# Patient Record
Sex: MALE | URBAN - METROPOLITAN AREA
[De-identification: names, ages, dates, MRNs, and addresses within clinical notes are randomized per-mention and may not be internally consistent; named-entity substitution may affect disease eponyms.]

---

## 2021-01-19 ENCOUNTER — OFFICE VISIT (OUTPATIENT)
Dept: URBAN - METROPOLITAN AREA CLINIC 75 | Facility: CLINIC | Age: 81
End: 2021-01-19
Payer: COMMERCIAL

## 2021-01-19 DIAGNOSIS — H35.033 HYPERTENSIVE RETINOPATHY, BILATERAL: ICD-10-CM

## 2021-01-19 DIAGNOSIS — H35.362 DRUSEN (DEGENERATIVE) OF MACULA, LEFT EYE: ICD-10-CM

## 2021-01-19 PROCEDURE — 99204 OFFICE O/P NEW MOD 45 MIN: CPT | Performed by: OPTOMETRIST

## 2021-01-19 ASSESSMENT — INTRAOCULAR PRESSURE
OS: 13
OD: 16

## 2021-01-19 ASSESSMENT — VISUAL ACUITY
OS: 20/30
OD: 20/25

## 2021-01-19 NOTE — IMPRESSION/PLAN
Impression: Combined forms of age-related cataract, bilateral: H25.813. Plan: Cataracts account for the patient's complaints. The patient will monitor vision changes and contact us with any decrease in vision.

## 2021-01-19 NOTE — IMPRESSION/PLAN
Impression: Drusen (degenerative) of macula, left eye: H35.362. Plan: A diet rich in green vegetables is recommended. It is important to follow the amsler grid and report any changes such as increase in distortion or blurring of lines. Drusen may be an early sign of macular degeneration and this diagnosis may be possible in future. Contact office if you experience decrease in vision, blurred vision, distortion in vision or concerning changes in vision. TAKE EYE VITAMINS AND CHECK AMSLER GRID DAILY.  

* Follow up in 6 months DFE w/OCT

## 2021-01-19 NOTE — IMPRESSION/PLAN
Impression: Type 2 diabetes mellitus w/o complication: T45.0. Plan: Diabetes type II: no background retinopathy, no signs of neovascularization noted. Discussed ocular and systemic benefits of blood sugar control.

## 2022-02-22 ENCOUNTER — OFFICE VISIT (OUTPATIENT)
Dept: URBAN - METROPOLITAN AREA CLINIC 75 | Facility: CLINIC | Age: 82
End: 2022-02-22
Payer: COMMERCIAL

## 2022-02-22 DIAGNOSIS — H35.361 DRUSEN (DEGENERATIVE) OF MACULA, RIGHT EYE: ICD-10-CM

## 2022-02-22 DIAGNOSIS — H52.4 PRESBYOPIA: ICD-10-CM

## 2022-02-22 DIAGNOSIS — H43.811 VITREOUS DEGENERATION, RIGHT EYE: ICD-10-CM

## 2022-02-22 DIAGNOSIS — H25.813 COMBINED FORMS OF AGE-RELATED CATARACT, BILATERAL: Primary | ICD-10-CM

## 2022-02-22 DIAGNOSIS — E11.9 TYPE 2 DIABETES MELLITUS W/O COMPLICATION: ICD-10-CM

## 2022-02-22 PROCEDURE — 99214 OFFICE O/P EST MOD 30 MIN: CPT | Performed by: OPTOMETRIST

## 2022-02-22 PROCEDURE — 92134 CPTRZ OPH DX IMG PST SGM RTA: CPT | Performed by: OPTOMETRIST

## 2022-02-22 PROCEDURE — 92133 CPTRZD OPH DX IMG PST SGM ON: CPT | Performed by: OPTOMETRIST

## 2022-02-22 ASSESSMENT — VISUAL ACUITY
OD: 20/25
OS: 20/40

## 2022-02-22 ASSESSMENT — INTRAOCULAR PRESSURE
OS: 10
OD: 13

## 2022-02-22 NOTE — IMPRESSION/PLAN
Impression: Vitreous degeneration, right eye: H43.811. Plan: Posterior vitreous detachments (PVD) usually diminish with time, but it may take several months. They rarely disappear entirely. PVD is a normal aging change due to vitreous jelly pulling away from the retinal lining of the eye. They may accompany retinal tears, retinal holes, or retinal detachments, so a dilated retinal exam is important. Contact office if symptoms worsen, including increased floaters, flashing light, loss of vision or a black curtain blocking your field of vision.

## 2022-02-22 NOTE — IMPRESSION/PLAN
Impression: Type 2 diabetes mellitus w/o complication: V90.0. OCT ordered and performed Plan: Diabetes type II: no background retinopathy, no signs of neovascularization noted. Discussed ocular and systemic benefits of blood sugar control.

## 2022-02-22 NOTE — IMPRESSION/PLAN
Impression: Drusen (degenerative) of macula, right eye: H35.361. OCT ordered and performed Plan: There are no specific modalities proven to be effective to treat drusen. A diet rich in green vegetables is recommended. It is important to follow the amsler grid and report any changes such as increase in distortion or blurring of lines. The drusen may increase with time and to try to decrease progression the recommendations above should be followed. If drusen progress they can cause decrease in vision or distortion of vision. Drusen may be an early sign of macular degeneration and this diagnosis may be possible in future. Avoidance of smoking or smoking cessation is recommended. Contact office if you experience decrease in vision, blurred vision, distortion in vision or concerning changes in vision. TAKE EYE VITAMINS AND CHECK AMSLER GRID DAILY.

## 2022-02-22 NOTE — IMPRESSION/PLAN
Impression: Presbyopia: H52.4. Plan: New glasses Rx was given today. Pt to contact office with any questions or concerns. PRN Return 1yr Full Vision

## 2022-10-24 ENCOUNTER — OFFICE VISIT (OUTPATIENT)
Dept: URBAN - METROPOLITAN AREA CLINIC 75 | Facility: CLINIC | Age: 82
End: 2022-10-24
Payer: COMMERCIAL

## 2022-10-24 DIAGNOSIS — H25.813 COMBINED FORMS OF AGE-RELATED CATARACT, BILATERAL: Primary | ICD-10-CM

## 2022-10-24 DIAGNOSIS — H02.889 MEIBOMIAN GLAND DYSFUNCTION OF EYE: ICD-10-CM

## 2022-10-24 DIAGNOSIS — H35.3131 NONEXUDATIVE AGE-RELATED MACULAR DEGENERATION, BILATERAL, EARLY DRY STAGE: ICD-10-CM

## 2022-10-24 DIAGNOSIS — H52.4 PRESBYOPIA: ICD-10-CM

## 2022-10-24 DIAGNOSIS — E11.9 TYPE 2 DIABETES MELLITUS W/O COMPLICATION: ICD-10-CM

## 2022-10-24 DIAGNOSIS — H04.123 DRY EYE SYNDROME OF BILATERAL LACRIMAL GLANDS: ICD-10-CM

## 2022-10-24 PROCEDURE — 92134 CPTRZ OPH DX IMG PST SGM RTA: CPT

## 2022-10-24 PROCEDURE — 99214 OFFICE O/P EST MOD 30 MIN: CPT

## 2022-10-24 ASSESSMENT — INTRAOCULAR PRESSURE
OD: 12
OS: 12

## 2022-10-24 ASSESSMENT — VISUAL ACUITY
OS: 20/30
OD: 20/30

## 2022-10-24 NOTE — IMPRESSION/PLAN
Impression: Combined forms of age-related cataract, bilateral: H25.813. OCT ordered and done today NA Cat SX OS then OD w/ Dr Kerry Perla PT to return to Dr Colleen Ni for PO No Retinal Clearance Plan: Cataracts account for the patient's complaints. Discussed all risks, benefits, procedures and recovery. Patient understands changing glasses will not improve vision. Patient desires to have surgery, recommend phacoemulsification with intraocular lens. 

Discussed due to  Drusen you cannot expect perfect vision after Cataract Surgery

## 2022-10-24 NOTE — IMPRESSION/PLAN
Impression: Type 2 diabetes mellitus w/o complication: Q56.2. Plan: No background retinopathy, no signs of neovascularization noted. Discussed ocular and systemic benefits of blood sugar control.

## 2022-10-24 NOTE — IMPRESSION/PLAN
Impression: Nonexudative age-related macular degeneration, bilateral, early dry stage: H35.3131. OCT ordered and done today Plan: Discussed DX and possible outcomes with pt in detail Explained to pt that there is no treatment for Dry AMD
Discussed Vitamin/AREDS with pt, do not recommend them at this time due to early stage. Recommend sunglasses outside, no smoking and a good healthy diet. Will monitor yearly with DFE and OCT Pt voices understanding.

## 2022-12-01 ENCOUNTER — PRE-OPERATIVE VISIT (OUTPATIENT)
Dept: URBAN - METROPOLITAN AREA CLINIC 71 | Facility: CLINIC | Age: 82
End: 2022-12-01
Payer: COMMERCIAL

## 2022-12-01 DIAGNOSIS — H25.813 COMBINED FORMS OF AGE-RELATED CATARACT, BILATERAL: Primary | ICD-10-CM

## 2022-12-01 PROCEDURE — 92136 OPHTHALMIC BIOMETRY: CPT | Performed by: OPHTHALMOLOGY

## 2023-01-17 ENCOUNTER — PRE-OPERATIVE VISIT (OUTPATIENT)
Dept: URBAN - METROPOLITAN AREA CLINIC 71 | Facility: CLINIC | Age: 83
End: 2023-01-17
Payer: COMMERCIAL

## 2023-01-17 DIAGNOSIS — E11.9 TYPE 2 DIABETES MELLITUS W/O COMPLICATION: ICD-10-CM

## 2023-01-17 DIAGNOSIS — H35.3131 NONEXUDATIVE AGE-RELATED MACULAR DEGENERATION, BILATERAL, EARLY DRY STAGE: ICD-10-CM

## 2023-01-17 DIAGNOSIS — H25.813 COMBINED FORMS OF AGE-RELATED CATARACT, BILATERAL: Primary | ICD-10-CM

## 2023-01-17 PROCEDURE — 99213 OFFICE O/P EST LOW 20 MIN: CPT | Performed by: OPHTHALMOLOGY

## 2023-01-17 RX ORDER — KETOROLAC TROMETHAMINE 5 MG/ML
0.5 % SOLUTION OPHTHALMIC
Qty: 5 | Refills: 1 | Status: ACTIVE
Start: 2023-01-17

## 2023-01-17 ASSESSMENT — KERATOMETRY
OD: 41.50
OS: 40.88

## 2023-01-17 ASSESSMENT — VISUAL ACUITY
OD: 20/30
OS: 20/30

## 2023-01-17 ASSESSMENT — INTRAOCULAR PRESSURE
OD: 16
OS: 15

## 2023-01-17 NOTE — IMPRESSION/PLAN
Impression: Nonexudative age-related macular degeneration, bilateral, early dry stage: H35.3131. OPTOS ordered today and reviewed. No leakage. Plan: Monitor.

## 2023-01-17 NOTE — IMPRESSION/PLAN
Impression: Combined forms of age-related cataract, bilateral: H25.813. Discussed in detail with patient about being set for near vs far. Patient is naturally farsighted, but does a lot of near work and wants to achieve the best vision as possible for near. Discussed with patient that he will definitely need glasses for distance and intermediate vision, but that there is also still a chance that for near he may also need glasses. Plan: Reviewed ascan, itrace, OCT and OPTOS. Heart and lungs clear. R/B/A discussed. Proceed with traditional cataract surgery with dextenza. OS first for near, then OD for near. Aim for -2.00 OU. Patient voices understanding that cataract surgery is not a guarantee for 20/20 vision and that glasses may be needed after the surgery. Patient is aware that the vision may remain limited after surgery due to the AMD. 

Patient declines ORA. Cardio clearance needed per Dr. Brittny Chapin due to the history of heart valve surgery in July 2021. Start ketorolac BID for 3 weeks starting the day prior to surgery on the surgical eye.

## 2023-01-17 NOTE — IMPRESSION/PLAN
Impression: Type 2 diabetes mellitus w/o complication: D06.8. Plan: No background retinopathy, no signs of neovascularization noted. Discussed ocular and systemic benefits of blood sugar control.

## 2023-02-02 ENCOUNTER — SURGERY (OUTPATIENT)
Dept: URBAN - METROPOLITAN AREA SURGERY 45 | Facility: SURGERY | Age: 83
End: 2023-02-02
Payer: COMMERCIAL

## 2023-02-02 ENCOUNTER — SURGERY (OUTPATIENT)
Dept: URBAN - METROPOLITAN AREA SURGERY 44 | Facility: SURGERY | Age: 83
End: 2023-02-02
Payer: COMMERCIAL

## 2023-02-02 PROCEDURE — 66984 XCAPSL CTRC RMVL W/O ECP: CPT | Performed by: OPHTHALMOLOGY

## 2023-02-03 ENCOUNTER — POST-OPERATIVE VISIT (OUTPATIENT)
Dept: URBAN - METROPOLITAN AREA CLINIC 75 | Facility: CLINIC | Age: 83
End: 2023-02-03
Payer: COMMERCIAL

## 2023-02-03 DIAGNOSIS — Z48.810 ENCOUNTER FOR SURGICAL AFTERCARE FOLLOWING SURGERY ON A SENSE ORGAN: Primary | ICD-10-CM

## 2023-02-03 PROCEDURE — 99024 POSTOP FOLLOW-UP VISIT: CPT | Performed by: OPTOMETRIST

## 2023-02-03 ASSESSMENT — INTRAOCULAR PRESSURE
OD: 12
OS: 9

## 2023-02-03 NOTE — IMPRESSION/PLAN
Impression:  Encounter for surgical aftercare following surgery on a sense organ  Z48.810. Excellent post op course   Post operative instructions reviewed - Plan: Pt clear to continue with second eye as scheduled. Recommended PF OTC drops frequently to promote comfort and healing . Contact office with any distortion in vision or concerns.  --Continue Ketorolac 0.5% BID OS

## 2023-02-08 DIAGNOSIS — H52.4 PRESBYOPIA: ICD-10-CM

## 2023-02-08 DIAGNOSIS — Z48.810 ENCOUNTER FOR SURGICAL AFTERCARE FOLLOWING SURGERY ON A SENSE ORGAN: Primary | ICD-10-CM

## 2023-02-08 PROCEDURE — 99024 POSTOP FOLLOW-UP VISIT: CPT | Performed by: OPTOMETRIST

## 2023-02-08 ASSESSMENT — VISUAL ACUITY: OS: 20/20

## 2023-02-08 ASSESSMENT — INTRAOCULAR PRESSURE
OS: 9
OD: 13

## 2023-02-08 NOTE — IMPRESSION/PLAN
Impression:  Encounter for surgical aftercare following surgery on a sense organ  Z48.810. Post operative instructions reviewed - Plan: Pt clear to continue with second eye as scheduled. Recommended PF OTC drops frequently to promote comfort and healing . Contact office with any distortion in vision or concerns. Pt clear to continue with second eye as scheduled. Recommended PF OTC drops frequently to promote comfort and healing . Contact office with any distortion in vision or concerns.

## 2023-02-08 NOTE — IMPRESSION/PLAN
Impression:  Encounter for surgical aftercare following surgery on a sense organ  Z48.810. Excellent post op course   Post operative instructions reviewed - Plan: Pt clear to continue with second eye as scheduled. Recommended PF OTC drops frequently to promote comfort and healing . Contact office with any distortion in vision or concerns.  --Continue Ketorolac 0.5%

## 2023-02-16 ENCOUNTER — SURGERY (OUTPATIENT)
Dept: URBAN - METROPOLITAN AREA SURGERY 45 | Facility: SURGERY | Age: 83
End: 2023-02-16
Payer: COMMERCIAL

## 2023-02-16 ENCOUNTER — SURGERY (OUTPATIENT)
Dept: URBAN - METROPOLITAN AREA SURGERY 44 | Facility: SURGERY | Age: 83
End: 2023-02-16
Payer: COMMERCIAL

## 2023-02-16 PROCEDURE — 66984 XCAPSL CTRC RMVL W/O ECP: CPT | Performed by: OPHTHALMOLOGY

## 2023-02-17 ENCOUNTER — POST-OPERATIVE VISIT (OUTPATIENT)
Dept: URBAN - METROPOLITAN AREA CLINIC 75 | Facility: CLINIC | Age: 83
End: 2023-02-17
Payer: COMMERCIAL

## 2023-02-17 DIAGNOSIS — Z96.1 PRESENCE OF INTRAOCULAR LENS: Primary | ICD-10-CM

## 2023-02-17 PROCEDURE — 99024 POSTOP FOLLOW-UP VISIT: CPT | Performed by: OPTOMETRIST

## 2023-02-17 ASSESSMENT — INTRAOCULAR PRESSURE
OS: 8
OD: 8

## 2023-02-17 NOTE — IMPRESSION/PLAN
Impression:  Presence of intraocular lens  Z96.1. Excellent post op course   Post operative instructions reviewed - Plan: Recommended PF OTC drops frequently to promote comfort and healing. Educated pt on Ctl bandage and abrasion present from tools. Pt to keep bandage over the weekend and return 3 days for removal.
Contact office with any distortion in vision or concerns. Return for 4wk PO w/Possible Ref as scheduled.  --Continue Ketorolac 0.5%

## 2023-02-20 ENCOUNTER — POST-OPERATIVE VISIT (OUTPATIENT)
Dept: URBAN - METROPOLITAN AREA CLINIC 75 | Facility: CLINIC | Age: 83
End: 2023-02-20
Payer: COMMERCIAL

## 2023-02-20 DIAGNOSIS — Z96.1 PRESENCE OF INTRAOCULAR LENS: Primary | ICD-10-CM

## 2023-02-20 PROCEDURE — 99024 POSTOP FOLLOW-UP VISIT: CPT | Performed by: OPTOMETRIST

## 2023-02-20 ASSESSMENT — INTRAOCULAR PRESSURE
OS: 11
OD: 19

## 2023-02-20 NOTE — IMPRESSION/PLAN
Impression:  Presence of intraocular lens  Z96.1. Post operative instructions reviewed - Condition is improving - Informed Pt he might feel some sensitivity after taking CL off. Pt to continue Ketorolac Gtts BID OU 2 day left OS along with OTC tears TID OU. Plan: Abrasion looks better with mild SPK noted OD.

## 2023-03-10 ENCOUNTER — POST-OPERATIVE VISIT (OUTPATIENT)
Dept: URBAN - METROPOLITAN AREA CLINIC 75 | Facility: CLINIC | Age: 83
End: 2023-03-10
Payer: COMMERCIAL

## 2023-03-10 DIAGNOSIS — Z96.1 PRESENCE OF INTRAOCULAR LENS: ICD-10-CM

## 2023-03-10 DIAGNOSIS — H52.4 PRESBYOPIA: Primary | ICD-10-CM

## 2023-03-10 PROCEDURE — 99024 POSTOP FOLLOW-UP VISIT: CPT | Performed by: OPTOMETRIST

## 2023-03-10 PROCEDURE — 92015 DETERMINE REFRACTIVE STATE: CPT | Performed by: OPTOMETRIST

## 2023-03-10 ASSESSMENT — INTRAOCULAR PRESSURE
OD: 10
OS: 14

## 2023-03-10 ASSESSMENT — VISUAL ACUITY
OD: 20/25
OS: 20/20

## 2023-03-10 NOTE — IMPRESSION/PLAN
Impression:  Presence of intraocular lens  Z96.1. Excellent post op course Plan: New glasses Rx was generated today. Pt to contact office with any questions or concerns. PRN Return 4m DE w/OCT AMD

## 2023-07-12 ENCOUNTER — OFFICE VISIT (OUTPATIENT)
Dept: URBAN - METROPOLITAN AREA CLINIC 75 | Facility: CLINIC | Age: 83
End: 2023-07-12
Payer: COMMERCIAL

## 2023-07-12 DIAGNOSIS — H35.3131 NONEXUDATIVE AGE-RELATED MACULAR DEGENERATION, BILATERAL, EARLY DRY STAGE: Primary | ICD-10-CM

## 2023-07-12 DIAGNOSIS — H43.823 VITREOMACULAR TRACTION OF BILATERAL EYES: ICD-10-CM

## 2023-07-12 DIAGNOSIS — H04.123 DRY EYE SYNDROME OF BILATERAL LACRIMAL GLANDS: ICD-10-CM

## 2023-07-12 PROCEDURE — 99213 OFFICE O/P EST LOW 20 MIN: CPT | Performed by: OPTOMETRIST

## 2023-07-12 PROCEDURE — 92134 CPTRZ OPH DX IMG PST SGM RTA: CPT | Performed by: OPTOMETRIST

## 2023-07-12 ASSESSMENT — INTRAOCULAR PRESSURE
OS: 10
OD: 9

## 2024-01-10 ENCOUNTER — OFFICE VISIT (OUTPATIENT)
Dept: URBAN - METROPOLITAN AREA CLINIC 75 | Facility: CLINIC | Age: 84
End: 2024-01-10
Payer: COMMERCIAL

## 2024-01-10 DIAGNOSIS — E11.9 TYPE 2 DIABETES MELLITUS W/O COMPLICATION: ICD-10-CM

## 2024-01-10 DIAGNOSIS — H52.4 PRESBYOPIA: ICD-10-CM

## 2024-01-10 DIAGNOSIS — H35.3131 NONEXUDATIVE AGE-RELATED MACULAR DEGENERATION, BILATERAL, EARLY DRY STAGE: Primary | ICD-10-CM

## 2024-01-10 PROCEDURE — 99214 OFFICE O/P EST MOD 30 MIN: CPT | Performed by: OPTOMETRIST

## 2024-01-10 PROCEDURE — 92134 CPTRZ OPH DX IMG PST SGM RTA: CPT | Performed by: OPTOMETRIST

## 2024-01-10 ASSESSMENT — VISUAL ACUITY
OS: 20/30
OD: 20/25

## 2024-01-10 ASSESSMENT — INTRAOCULAR PRESSURE
OD: 15
OS: 12

## 2024-07-10 ENCOUNTER — OFFICE VISIT (OUTPATIENT)
Dept: URBAN - METROPOLITAN AREA CLINIC 75 | Facility: CLINIC | Age: 84
End: 2024-07-10
Payer: COMMERCIAL

## 2024-07-10 DIAGNOSIS — H04.123 DRY EYE SYNDROME OF BILATERAL LACRIMAL GLANDS: ICD-10-CM

## 2024-07-10 DIAGNOSIS — H35.3131 NONEXUDATIVE AGE-RELATED MACULAR DEGENERATION, BILATERAL, EARLY DRY STAGE: Primary | ICD-10-CM

## 2024-07-10 DIAGNOSIS — E11.9 TYPE 2 DIABETES MELLITUS W/O COMPLICATION: ICD-10-CM

## 2024-07-10 PROCEDURE — 92134 CPTRZ OPH DX IMG PST SGM RTA: CPT | Performed by: OPTOMETRIST

## 2024-07-10 PROCEDURE — 99213 OFFICE O/P EST LOW 20 MIN: CPT | Performed by: OPTOMETRIST

## 2024-07-10 RX ORDER — POLYETHYLENE GLYCOL 400 AND PROPYLENE GLYCOL 4; 3 MG/ML; MG/ML
SOLUTION/ DROPS OPHTHALMIC
Qty: 15 | Refills: 3 | Status: ACTIVE
Start: 2024-07-10

## 2024-07-10 ASSESSMENT — INTRAOCULAR PRESSURE
OS: 8
OD: 9